# Patient Record
Sex: FEMALE | Race: OTHER | NOT HISPANIC OR LATINO
[De-identification: names, ages, dates, MRNs, and addresses within clinical notes are randomized per-mention and may not be internally consistent; named-entity substitution may affect disease eponyms.]

---

## 2022-07-14 PROBLEM — Z00.00 ENCOUNTER FOR PREVENTIVE HEALTH EXAMINATION: Status: ACTIVE | Noted: 2022-07-14

## 2022-07-25 ENCOUNTER — APPOINTMENT (OUTPATIENT)
Dept: ORTHOPEDIC SURGERY | Facility: CLINIC | Age: 52
End: 2022-07-25

## 2022-07-25 VITALS — HEIGHT: 60 IN | WEIGHT: 162 LBS | BODY MASS INDEX: 31.8 KG/M2

## 2022-07-25 PROCEDURE — 99024 POSTOP FOLLOW-UP VISIT: CPT

## 2022-07-25 RX ORDER — OXYCODONE AND ACETAMINOPHEN 5; 325 MG/1; MG/1
5-325 TABLET ORAL
Qty: 20 | Refills: 0 | Status: COMPLETED | COMMUNITY
Start: 2022-05-11

## 2022-07-25 RX ORDER — ESCITALOPRAM OXALATE 20 MG/1
20 TABLET ORAL
Qty: 30 | Refills: 0 | Status: COMPLETED | COMMUNITY
Start: 2021-10-26

## 2022-07-25 RX ORDER — CEFUROXIME AXETIL 500 MG/1
500 TABLET ORAL
Qty: 20 | Refills: 0 | Status: COMPLETED | COMMUNITY
Start: 2022-02-21

## 2022-07-25 RX ORDER — NAPROXEN 500 MG/1
500 TABLET ORAL
Qty: 20 | Refills: 0 | Status: COMPLETED | COMMUNITY
Start: 2022-02-08

## 2022-07-25 RX ORDER — TRAMADOL HYDROCHLORIDE 50 MG/1
50 TABLET, COATED ORAL
Qty: 10 | Refills: 0 | Status: COMPLETED | COMMUNITY
Start: 2022-03-11

## 2022-07-25 RX ORDER — OMEPRAZOLE 20 MG/1
20 CAPSULE, DELAYED RELEASE ORAL
Qty: 90 | Refills: 0 | Status: COMPLETED | COMMUNITY
Start: 2021-08-31

## 2022-07-25 RX ORDER — PREDNISONE 20 MG/1
20 TABLET ORAL
Qty: 10 | Refills: 0 | Status: COMPLETED | COMMUNITY
Start: 2022-02-21

## 2022-07-25 NOTE — PHYSICAL EXAM
[Right] : right shoulder [NL (0-70)] : full internal rotation 0-70 degrees [] : motor and sensory intact distally [de-identified] :   She has some weakness with rotator cuff resistance testing. [TWNoteComboBox7] : active forward flexion 120 degrees [TWNoteComboBox4] : passive forward flexion 170 degrees [de-identified] : active abduction 100 degrees [TWNoteComboBox6] : internal rotation L4

## 2022-07-25 NOTE — HISTORY OF PRESENT ILLNESS
[de-identified] :  The patient is a 51-year-old female here for subsequent re-evaluation of her right shoulder.  She is status post right shoulder arthroscopy arthroscopic medial and lateral row rotator cuff repair, subacromial decompression, extensive debridement of glenohumeral joint, biceps tenodesis and lysis of adhesions on 05/13/2022.  She is little over 2 months from her surgery.  She is doing formal physical therapy and her range of motion has improved.  She is still getting pain, she points over the superior aspect of the right shoulder as to where the pain is.

## 2022-07-25 NOTE — DISCUSSION/SUMMARY
[de-identified] : At this point she will continue formal physical therapy for the right shoulder.  I will see her back in 2 months for further evaluation.\par \par Supervising physician: Dr. Peters

## 2022-08-11 ENCOUNTER — NON-APPOINTMENT (OUTPATIENT)
Age: 52
End: 2022-08-11

## 2022-09-27 ENCOUNTER — APPOINTMENT (OUTPATIENT)
Dept: ORTHOPEDIC SURGERY | Facility: CLINIC | Age: 52
End: 2022-09-27

## 2022-09-27 PROCEDURE — 99213 OFFICE O/P EST LOW 20 MIN: CPT

## 2022-09-27 NOTE — DISCUSSION/SUMMARY
[de-identified] : At this point she will continue formal physical therapy for the right shoulder.  I will see her back in 4 weeks for further evaluation.  She would like to return to work on 11/1/2022 for [employer].\par \par Supervising physician: Dr. Echavarria

## 2022-09-27 NOTE — HISTORY OF PRESENT ILLNESS
[de-identified] :  The patient is a 52-year-old female here for a subsequent re-evaluation of her right shoulder.  She is status post right shoulder arthroscopy arthroscopic medial and lateral row rotator cuff repair, subacromial decompression, extensive debridement of glenohumeral joint, biceps tenodesis and lysis of adhesions on 05/13/2022.  She is little over 4 months from her surgery.  She has good and bad days with her shoulder.  She is doing therapy [facility] in [place].  She would like to return to work for [employer] 11/01/2022.

## 2022-09-27 NOTE — PHYSICAL EXAM
[Right] : right shoulder [NL (0-70)] : full internal rotation 0-70 degrees [] : motor and sensory intact distally [de-identified] :   She has improved strength with rotator cuff resistance testing but still some weakness. [TWNoteComboBox7] : active forward flexion 160 degrees [TWNoteComboBox4] : passive forward flexion 180 degrees [de-identified] : active abduction 120 degrees [TWNoteComboBox6] : internal rotation L4

## 2022-10-17 ENCOUNTER — NON-APPOINTMENT (OUTPATIENT)
Age: 52
End: 2022-10-17

## 2022-11-14 ENCOUNTER — APPOINTMENT (OUTPATIENT)
Dept: ORTHOPEDIC SURGERY | Facility: CLINIC | Age: 52
End: 2022-11-14

## 2022-11-14 PROCEDURE — 99213 OFFICE O/P EST LOW 20 MIN: CPT

## 2022-11-14 RX ORDER — AZITHROMYCIN 250 MG/1
250 TABLET, FILM COATED ORAL
Qty: 6 | Refills: 0 | Status: ACTIVE | COMMUNITY
Start: 2022-09-22

## 2022-11-14 NOTE — DISCUSSION/SUMMARY
[de-identified] : At this point I recommend she continues with formal physical therapy, new Rx provided for that.  I will see her back in 4 weeks for further evaluation.  At that appointment we may clear her to return back to work on 01/01/2023.\par \par Supervising physician:

## 2022-11-14 NOTE — PHYSICAL EXAM
[Right] : right shoulder [NL (0-70)] : full internal rotation 0-70 degrees [] : no atrophy [de-identified] : Still some weakness with rotator cuff resistance testing. [TWNoteComboBox7] : active forward flexion 150 degrees [TWNoteComboBox4] : passive forward flexion 180 degrees [de-identified] : active abduction 120 degrees [TWNoteComboBox6] : internal rotation L3

## 2022-12-12 ENCOUNTER — APPOINTMENT (OUTPATIENT)
Dept: ORTHOPEDIC SURGERY | Facility: CLINIC | Age: 52
End: 2022-12-12

## 2022-12-12 PROCEDURE — 99214 OFFICE O/P EST MOD 30 MIN: CPT

## 2022-12-12 NOTE — HISTORY OF PRESENT ILLNESS
[de-identified] : Patient is here for evaluation of the right shoulder had rotator cuff repair in May is interested in going back to work in 1 month for [employer]\par \par  right shoulder has good forward flexion some loss of abduction good strength of the rotator cuff\par \par  recommend continue with therapy with the strengthening stretching modalities will clear her for full duty no restrictions for mid January will see her back in 2 month intervals

## 2023-02-10 ENCOUNTER — APPOINTMENT (OUTPATIENT)
Dept: ORTHOPEDIC SURGERY | Facility: CLINIC | Age: 53
End: 2023-02-10

## 2023-02-15 ENCOUNTER — APPOINTMENT (OUTPATIENT)
Dept: ORTHOPEDIC SURGERY | Facility: CLINIC | Age: 53
End: 2023-02-15
Payer: COMMERCIAL

## 2023-02-15 DIAGNOSIS — S46.011D STRAIN OF MUSCLE(S) AND TENDON(S) OF THE ROTATOR CUFF OF RIGHT SHOULDER, SUBSEQUENT ENCOUNTER: ICD-10-CM

## 2023-02-15 PROCEDURE — 99213 OFFICE O/P EST LOW 20 MIN: CPT

## 2023-02-15 NOTE — DISCUSSION/SUMMARY
[de-identified] : At this point I recommend she do home therapy exercises for the right shoulder, she was given a printout from the [employer] for a shoulder conditioning program, I will see her in 2 months for further evaluation.\par \par Supervising Physician: Dr. Peters

## 2023-02-15 NOTE — HISTORY OF PRESENT ILLNESS
[de-identified] :  The patient is a 52-year-old female here for a subsequent re-evaluation of her right shoulder.  She is status post right shoulder arthroscopy arthroscopic medial and lateral row rotator cuff repair, subacromial decompression, extensive debridement of glenohumeral joint, biceps tenodesis and lysis of adhesions on 05/13/2022.  She is little over 6 months from her surgery.  She still has good and bad days with the shoulder, is doing formal physical therapy at [facility].
No deformity or limitation of movement

## 2023-02-15 NOTE — PHYSICAL EXAM
[Right] : right shoulder [NL (0-70)] : full internal rotation 0-70 degrees [] : motor and sensory intact distally [de-identified] : Slight weakness with rotator cuff resistance testing. [TWNoteComboBox7] : active forward flexion 180 degrees [TWNoteComboBox4] : False [de-identified] : active abduction 170 degrees [TWNoteComboBox6] : internal rotation L2

## 2023-04-19 ENCOUNTER — APPOINTMENT (OUTPATIENT)
Dept: ORTHOPEDIC SURGERY | Facility: CLINIC | Age: 53
End: 2023-04-19

## 2023-11-30 ENCOUNTER — APPOINTMENT (OUTPATIENT)
Dept: ORTHOPEDIC SURGERY | Facility: CLINIC | Age: 53
End: 2023-11-30
Payer: COMMERCIAL

## 2023-11-30 VITALS — WEIGHT: 155 LBS | HEIGHT: 60 IN | BODY MASS INDEX: 30.43 KG/M2

## 2023-11-30 PROCEDURE — 20550 NJX 1 TENDON SHEATH/LIGAMENT: CPT | Mod: F5

## 2023-11-30 PROCEDURE — 99202 OFFICE O/P NEW SF 15 MIN: CPT | Mod: 25

## 2024-01-08 ENCOUNTER — APPOINTMENT (OUTPATIENT)
Dept: ORTHOPEDIC SURGERY | Facility: CLINIC | Age: 54
End: 2024-01-08
Payer: SELF-PAY

## 2024-01-08 PROCEDURE — 99213 OFFICE O/P EST LOW 20 MIN: CPT

## 2024-01-08 NOTE — PHYSICAL EXAM
[de-identified] : Patient is tenderness to palpation A1 pulley right thumb.  There is triggering present.  There is normal sensation normal cap refill.  No erythema ecchymoses or abrasions.

## 2024-01-08 NOTE — ASSESSMENT
[FreeTextEntry1] : Patient is a right-sided trigger thumb.  Patient will release to be set up for surgery for right trigger thumb release.  Risk and benefits of surgery, limited to bleeding infection risk injury and stiffness discussed with the patient.  Surgery will be under local anesthesia.

## 2024-01-08 NOTE — HISTORY OF PRESENT ILLNESS
[de-identified] : 53-year-old female sided trigger thumb failed to get better with initial cortisone injection comes in today for evaluation still complaining about pain discomfort and locking of her right thumb.

## 2024-02-08 ENCOUNTER — APPOINTMENT (OUTPATIENT)
Dept: ORTHOPEDIC SURGERY | Facility: AMBULATORY SURGERY CENTER | Age: 54
End: 2024-02-08
Payer: COMMERCIAL

## 2024-02-08 DIAGNOSIS — M65.311 TRIGGER THUMB, RIGHT THUMB: ICD-10-CM

## 2024-02-08 PROCEDURE — 26055 INCISE FINGER TENDON SHEATH: CPT | Mod: F5

## 2024-02-08 RX ORDER — IBUPROFEN 800 MG/1
800 TABLET, FILM COATED ORAL 3 TIMES DAILY
Qty: 20 | Refills: 0 | Status: ACTIVE | COMMUNITY
Start: 2024-02-08 | End: 1900-01-01

## 2024-02-21 ENCOUNTER — APPOINTMENT (OUTPATIENT)
Dept: ORTHOPEDIC SURGERY | Facility: CLINIC | Age: 54
End: 2024-02-21

## 2024-03-04 ENCOUNTER — APPOINTMENT (OUTPATIENT)
Dept: ORTHOPEDIC SURGERY | Facility: CLINIC | Age: 54
End: 2024-03-04
Payer: COMMERCIAL

## 2024-03-04 VITALS — BODY MASS INDEX: 31.41 KG/M2 | HEIGHT: 60 IN | WEIGHT: 160 LBS

## 2024-03-04 DIAGNOSIS — M94.20 CHONDROMALACIA, UNSPECIFIED SITE: ICD-10-CM

## 2024-03-04 PROCEDURE — 73560 X-RAY EXAM OF KNEE 1 OR 2: CPT | Mod: 50

## 2024-03-04 PROCEDURE — 99213 OFFICE O/P EST LOW 20 MIN: CPT

## 2024-03-04 NOTE — ASSESSMENT
[FreeTextEntry1] : The patient comes in for an acute exacerbation of a chronic problem. The patient has been seen in the past for both knees. The patient states she needed to be seen due to her handicap parking permit. The patient states her knees bother her in certain weather.     X-RAY bilateral knees 2 view no soft tissue calcifications well maintained joint spaces ,  patella arthritis left great than right.  Diagnosis is right and left knee patella  The patient will start physical therapy. She will follow up as needed.  Recommend she get handicap parking pass

## 2024-04-25 ENCOUNTER — APPOINTMENT (OUTPATIENT)
Dept: NEUROLOGY | Facility: CLINIC | Age: 54
End: 2024-04-25